# Patient Record
Sex: MALE | Race: AMERICAN INDIAN OR ALASKA NATIVE
[De-identification: names, ages, dates, MRNs, and addresses within clinical notes are randomized per-mention and may not be internally consistent; named-entity substitution may affect disease eponyms.]

---

## 2022-04-29 ENCOUNTER — HOSPITAL ENCOUNTER (INPATIENT)
Dept: HOSPITAL 5 - ED | Age: 52
LOS: 3 days | Discharge: HOME | DRG: 439 | End: 2022-05-02
Attending: INTERNAL MEDICINE | Admitting: INTERNAL MEDICINE
Payer: SELF-PAY

## 2022-04-29 DIAGNOSIS — M62.82: ICD-10-CM
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
## 2022-04-29 DIAGNOSIS — R74.01: ICD-10-CM

## 2022-04-29 DIAGNOSIS — F10.20: ICD-10-CM

## 2022-04-29 DIAGNOSIS — K85.90: Primary | ICD-10-CM

## 2022-04-29 LAB
ALBUMIN SERPL-MCNC: 4.8 G/DL (ref 3.9–5)
ALT SERPL-CCNC: 32 UNITS/L (ref 7–56)
BASOPHILS # (AUTO): 0.1 K/MM3 (ref 0–0.1)
BASOPHILS NFR BLD AUTO: 0.9 % (ref 0–1.8)
BUN SERPL-MCNC: 16 MG/DL (ref 9–20)
BUN/CREAT SERPL: 20 %
CALCIUM SERPL-MCNC: 9.4 MG/DL (ref 8.4–10.2)
EOSINOPHIL # BLD AUTO: 0.1 K/MM3 (ref 0–0.4)
EOSINOPHIL NFR BLD AUTO: 1 % (ref 0–4.3)
HCT VFR BLD CALC: 39.7 % (ref 35.5–45.6)
HEMOLYSIS INDEX: 18
HGB BLD-MCNC: 13 GM/DL (ref 11.8–15.2)
INR PPP: 0.87 (ref 0.87–1.13)
LYMPHOCYTES # BLD AUTO: 1 K/MM3 (ref 1.2–5.4)
LYMPHOCYTES NFR BLD AUTO: 12.5 % (ref 13.4–35)
MCHC RBC AUTO-ENTMCNC: 33 % (ref 32–34)
MCV RBC AUTO: 92 FL (ref 84–94)
MONOCYTES # (AUTO): 0.5 K/MM3 (ref 0–0.8)
MONOCYTES % (AUTO): 6.4 % (ref 0–7.3)
PLATELET # BLD: 247 K/MM3 (ref 140–440)
RBC # BLD AUTO: 4.32 M/MM3 (ref 3.65–5.03)

## 2022-04-29 PROCEDURE — 96365 THER/PROPH/DIAG IV INF INIT: CPT

## 2022-04-29 PROCEDURE — 96376 TX/PRO/DX INJ SAME DRUG ADON: CPT

## 2022-04-29 PROCEDURE — C9113 INJ PANTOPRAZOLE SODIUM, VIA: HCPCS

## 2022-04-29 PROCEDURE — 36415 COLL VENOUS BLD VENIPUNCTURE: CPT

## 2022-04-29 PROCEDURE — 85610 PROTHROMBIN TIME: CPT

## 2022-04-29 PROCEDURE — 83690 ASSAY OF LIPASE: CPT

## 2022-04-29 PROCEDURE — 80053 COMPREHEN METABOLIC PANEL: CPT

## 2022-04-29 PROCEDURE — 82150 ASSAY OF AMYLASE: CPT

## 2022-04-29 PROCEDURE — 80320 DRUG SCREEN QUANTALCOHOLS: CPT

## 2022-04-29 PROCEDURE — 85025 COMPLETE CBC W/AUTO DIFF WBC: CPT

## 2022-04-29 PROCEDURE — 93005 ELECTROCARDIOGRAM TRACING: CPT

## 2022-04-29 PROCEDURE — 96375 TX/PRO/DX INJ NEW DRUG ADDON: CPT

## 2022-04-29 PROCEDURE — G0480 DRUG TEST DEF 1-7 CLASSES: HCPCS

## 2022-04-29 PROCEDURE — 83735 ASSAY OF MAGNESIUM: CPT

## 2022-04-29 PROCEDURE — 99285 EMERGENCY DEPT VISIT HI MDM: CPT

## 2022-04-29 PROCEDURE — 82550 ASSAY OF CK (CPK): CPT

## 2022-04-29 PROCEDURE — 74177 CT ABD & PELVIS W/CONTRAST: CPT

## 2022-04-29 RX ADMIN — HYDROMORPHONE HYDROCHLORIDE PRN MG: 1 INJECTION, SOLUTION INTRAMUSCULAR; INTRAVENOUS; SUBCUTANEOUS at 12:13

## 2022-04-29 RX ADMIN — Medication SCH ML: at 21:47

## 2022-04-29 RX ADMIN — HEPARIN SODIUM SCH UNIT: 5000 INJECTION, SOLUTION INTRAVENOUS; SUBCUTANEOUS at 12:13

## 2022-04-29 RX ADMIN — ONDANSETRON PRN MG: 2 INJECTION INTRAMUSCULAR; INTRAVENOUS at 12:13

## 2022-04-29 RX ADMIN — HYDROMORPHONE HYDROCHLORIDE PRN MG: 1 INJECTION, SOLUTION INTRAMUSCULAR; INTRAVENOUS; SUBCUTANEOUS at 18:17

## 2022-04-29 RX ADMIN — Medication SCH ML: at 12:12

## 2022-04-29 RX ADMIN — HEPARIN SODIUM SCH UNIT: 5000 INJECTION, SOLUTION INTRAVENOUS; SUBCUTANEOUS at 21:52

## 2022-04-29 RX ADMIN — HYDROMORPHONE HYDROCHLORIDE PRN MG: 1 INJECTION, SOLUTION INTRAMUSCULAR; INTRAVENOUS; SUBCUTANEOUS at 21:48

## 2022-04-29 RX ADMIN — DEXTROSE AND SODIUM CHLORIDE SCH MLS/HR: 5; .9 INJECTION, SOLUTION INTRAVENOUS at 15:27

## 2022-04-29 NOTE — EMERGENCY DEPARTMENT REPORT
ED General Adult HPI





- General


Chief complaint: Abdominal Pain


Stated complaint: It is my pancreas


Time Seen by Provider: 04/29/22 06:10


Source: patient, RN notes reviewed


Mode of arrival: Ambulatory


Limitations: No Limitations





- History of Present Illness


Initial comments: 





This patient is a 52-year-old gentleman with a history of alcohol induced pancre

atitis, who presents to the ER today with a complaint of diffuse abdominal pain,

nausea and vomiting, consistent with prior episodes of pancreatitis.  Last 

alcoholic beverage was yesterday.





He is not homicidal suicidal.








-: Gradual


Location: abdomen


Severity scale (0 -10): 10


Consistency: constant


Improves with: medication, rest


Worsens with: movement





- Related Data


                                    Allergies











Allergy/AdvReac Type Severity Reaction Status Date / Time


 


No Known Allergies Allergy   Verified 04/29/22 06:16














ED Review of Systems


ROS: 


Stated complaint: ABD PAIN,VOMITING


Other details as noted in HPI





Constitutional: denies: fever


Eyes: denies: eye discharge


ENT: denies: epistaxis


Respiratory: denies: cough


Cardiovascular: denies: chest pain


Gastrointestinal: abdominal pain, nausea, vomiting.  denies: hematochezia


Genitourinary: denies: dysuria


Neurological: weakness


Psychiatric: denies: homicidal thoughts, suicidal thoughts





ED Physical Exam





- General


Limitations: No Limitations


General appearance: alert, anxious, in distress





- Head


Head exam: Present: atraumatic, normocephalic





- Eye


Eye exam: Present: normal appearance, EOMI.  Absent: conjunctival injection, 

nystagmus





- ENT


ENT exam: Present: normal exam, normal orophraynx, mucous membranes moist, 

normal external ear exam





- Neck


Neck exam: Present: normal inspection, full ROM.  Absent: tenderness, 

meningismus





- Respiratory


Respiratory exam: Present: decreased breath sounds.  Absent: respiratory distre

ss, wheezes, rales, rhonchi, stridor





- Cardiovascular


Cardiovascular Exam: Present: regular rate, normal rhythm, normal heart sounds. 

Absent: bradycardia, tachycardia, irregular rhythm, systolic murmur, diastolic 

murmur, rubs, gallop





- GI/Abdominal


GI/Abdominal exam: Present: soft, tenderness.  Absent: distended, guarding, 

rebound, rigid, pulsatile mass





- Rectal


Rectal exam: Present: deferred





- Extremities Exam


Extremities exam: Present: normal inspection, full ROM, other (2+ pulses noted 

in the bilateral upper and lower extremities.  There is no palpable cord.   

negative Homans sign.  Muscular compartments are soft.  The pelvis is stable.). 

Absent: pedal edema, calf tenderness





- Back Exam


Back exam: Present: normal inspection.  Absent: tenderness, CVA tenderness (R), 

CVA tenderness (L), paraspinal tenderness, vertebral tenderness





- Neurological Exam


Neurological exam: Present: alert, other (No facial droop.  Tongue midline.  

Extraocular movements intact bilaterally.  Facial sensation intact to light 

touch in V1, V2, V3 distribution bilaterally.  5 and a 5 strength in 4 

extremities.  Sensation intact to light touch in 4 extremities.)





- Psychiatric


Psychiatric exam: Present: anxious





- Skin


Skin exam: Present: warm, dry, intact, normal color.  Absent: rash





ED Course


                                   Vital Signs











  04/29/22 04/29/22 04/29/22





  06:07 09:05 09:06


 


Temperature 98.9 F  


 


Pulse Rate 66 60 


 


Respiratory 30 H 20 





Rate   


 


Blood Pressure 147/89 150/90 





[Left]   


 


O2 Sat by Pulse 99 100 100





Oximetry   














- Reevaluation(s)


Reevaluation #1: 





04/29/22 09:28


Differential diagnosis, include but not limited to: Pancreatitis, obstruction, 

colitis, gastroenteritis





Assessment and plan: 52-year-old gentleman with probable alcohol induced 

pancreatitis.  Lipase is elevated at 1300.  He is found to have evidence of 

dehydration and elevated CK.





CT scan of the abdomen pelvis ordered and pending.  Admit to the medical service

 for dehydration, and probable alcohol induced pancreatitis.  Patient is agre

eable to this plan of care.  Discussed with hospital physician once CT scan has 

been formally interpreted by radiology


04/29/22 09:31


The patient is agreeable to admission and hospitalization


04/29/22 09:37


CT scan abdomen pelvis reviewed and appreciated.  Acute uncomplicated 

interstitial pancreatitis.  Hospital physician, Dr. RICHAR Charles to admit to [facility]





ED Medical Decision Making





- Lab Data


Result diagrams: 


                                 04/29/22 07:04





                                 04/29/22 07:04








                                   Vital Signs











  04/29/22 04/29/22 04/29/22





  06:07 09:05 09:06


 


Temperature 98.9 F  


 


Pulse Rate 66 60 


 


Respiratory 30 H 20 





Rate   


 


Blood Pressure 147/89 150/90 





[Left]   


 


O2 Sat by Pulse 99 100 100





Oximetry   











                                   Lab Results











  04/29/22 04/29/22 04/29/22 Range/Units





  07:04 07:04 07:04 


 


WBC  7.7    (4.5-11.0)  K/mm3


 


RBC  4.32    (3.65-5.03)  M/mm3


 


Hgb  13.0    (11.8-15.2)  gm/dl


 


Hct  39.7    (35.5-45.6)  %


 


MCV  92    (84-94)  fl


 


MCH  30    (28-32)  pg


 


MCHC  33    (32-34)  %


 


RDW  13.8    (13.2-15.2)  %


 


Plt Count  247    (140-440)  K/mm3


 


Lymph % (Auto)  12.5 L    (13.4-35.0)  %


 


Mono % (Auto)  6.4    (0.0-7.3)  %


 


Eos % (Auto)  1.0    (0.0-4.3)  %


 


Baso % (Auto)  0.9    (0.0-1.8)  %


 


Lymph # (Auto)  1.0 L    (1.2-5.4)  K/mm3


 


Mono # (Auto)  0.5    (0.0-0.8)  K/mm3


 


Eos # (Auto)  0.1    (0.0-0.4)  K/mm3


 


Baso # (Auto)  0.1    (0.0-0.1)  K/mm3


 


Seg Neutrophils %  79.2 H    (40.0-70.0)  %


 


Seg Neutrophils #  6.1    (1.8-7.7)  K/mm3


 


PT     (12.2-14.9)  Sec.


 


INR     (0.87-1.13)  


 


Sodium   136 L   (137-145)  mmol/L


 


Potassium   4.2   (3.6-5.0)  mmol/L


 


Chloride   100.9   ()  mmol/L


 


Carbon Dioxide   24   (22-30)  mmol/L


 


Anion Gap   15   mmol/L


 


BUN   16   (9-20)  mg/dL


 


Creatinine   0.8   (0.8-1.3)  mg/dL


 


Estimated GFR   > 60   ml/min


 


BUN/Creatinine Ratio   20   %


 


Glucose   148 H   ()  mg/dL


 


Calcium   9.4   (8.4-10.2)  mg/dL


 


Magnesium     (1.7-2.3)  mg/dL


 


Total Bilirubin   0.60   (0.1-1.2)  mg/dL


 


AST   52 H   (5-40)  units/L


 


ALT   32   (7-56)  units/L


 


Alkaline Phosphatase   46   ()  units/L


 


Total Creatine Kinase     ()  units/L


 


Total Protein   7.0   (6.3-8.2)  g/dL


 


Albumin   4.8   (3.9-5)  g/dL


 


Albumin/Globulin Ratio   2.2   %


 


Lipase    1328 H  (13-60)  units/L


 


Plasma/Serum Alcohol     (0-0.07)  %














  04/29/22 04/29/22 04/29/22 Range/Units





  07:30 08:16 08:16 


 


WBC     (4.5-11.0)  K/mm3


 


RBC     (3.65-5.03)  M/mm3


 


Hgb     (11.8-15.2)  gm/dl


 


Hct     (35.5-45.6)  %


 


MCV     (84-94)  fl


 


MCH     (28-32)  pg


 


MCHC     (32-34)  %


 


RDW     (13.2-15.2)  %


 


Plt Count     (140-440)  K/mm3


 


Lymph % (Auto)     (13.4-35.0)  %


 


Mono % (Auto)     (0.0-7.3)  %


 


Eos % (Auto)     (0.0-4.3)  %


 


Baso % (Auto)     (0.0-1.8)  %


 


Lymph # (Auto)     (1.2-5.4)  K/mm3


 


Mono # (Auto)     (0.0-0.8)  K/mm3


 


Eos # (Auto)     (0.0-0.4)  K/mm3


 


Baso # (Auto)     (0.0-0.1)  K/mm3


 


Seg Neutrophils %     (40.0-70.0)  %


 


Seg Neutrophils #     (1.8-7.7)  K/mm3


 


PT   12.8   (12.2-14.9)  Sec.


 


INR   0.87   (0.87-1.13)  


 


Sodium     (137-145)  mmol/L


 


Potassium     (3.6-5.0)  mmol/L


 


Chloride     ()  mmol/L


 


Carbon Dioxide     (22-30)  mmol/L


 


Anion Gap     mmol/L


 


BUN     (9-20)  mg/dL


 


Creatinine     (0.8-1.3)  mg/dL


 


Estimated GFR     ml/min


 


BUN/Creatinine Ratio     %


 


Glucose     ()  mg/dL


 


Calcium     (8.4-10.2)  mg/dL


 


Magnesium  2.20    (1.7-2.3)  mg/dL


 


Total Bilirubin     (0.1-1.2)  mg/dL


 


AST     (5-40)  units/L


 


ALT     (7-56)  units/L


 


Alkaline Phosphatase     ()  units/L


 


Total Creatine Kinase  1260 H    ()  units/L


 


Total Protein     (6.3-8.2)  g/dL


 


Albumin     (3.9-5)  g/dL


 


Albumin/Globulin Ratio     %


 


Lipase     (13-60)  units/L


 


Plasma/Serum Alcohol    < 0.01  (0-0.07)  %














- EKG Data


-: EKG Interpreted by Me


EKG shows normal: sinus rhythm


Rate: normal





- EKG Data


When compared to previous EKG there are: previous EKG unavailable





04/29/22 09:26


The EKG is interpreted at 08: 51





Sinus rhythm, bradycardia, 56 bpm.  Normal axis, normal P wave axis, left 

ventricular hypertrophy, and motion artifact.  This is an abnormal EKG.  This is

 not a STEMI





- Radiology Data


Radiology results: pending, report reviewed, image reviewed





CT ABDOMEN AND PELVIS WITH CONTRAST  INDICATION / CLINICAL INFORMATION: acute 

abd pain pancreatitis omni 300 100ml.  TECHNIQUE: Axial CT images were obtained 

through the abdomen and pelvis after 100 cc of Omnipaque 300 IV contrast. 

Sagittal and coronal reformatted images. All CT scans at this location are 

performed using CT dose reduction for ALARA by means of automated exposure 

control.  COMPARISON: None available.  FINDINGS: LOWER CHEST: Mild cardiomegaly 

is suspected. The visualized lung bases are clear. LIVER: There is mild 

hepatomegaly with steatosis. No focal liver lesion. GALLBLADDER: No significant 

abnormality. BILE DUCTS: No significant abnormality. PANCREAS: Mild inflammatory

 fat stranding and fluid surrounds the pancreas. No discrete mass, pseudocyst or

 perfusion defect. SPLEEN: No significant abnormality. ADRENALS: No significant 

abnormality. RIGHT KIDNEY and URETER: No significant abnormality. LEFT KIDNEY 

and URETER: No significant abnormality.  STOMACH and SMALL BOWEL: No significant

 abnormality. COLON: No significant abnormality. APPENDIX: No significant 

abnormality. PERITONEUM: No free air. No fluid collection. LYMPH NODES: No 

significant adenopathy. AORTA and ARTERIES: No significant abnormality. IVC and 

VEINS: No significant abnormality.  URINARY BLADDER: No significant abnormality.

 REPRODUCTIVE ORGANS: No significant abnormality.  ADDITIONAL FINDINGS: None.  

SKELETAL SYSTEM: No significant abnormality.  IMPRESSION: Findings consistent 

with acute interstitial pancreatitis. No obvious biliary stones on CT. 

Hepatomegaly with steatosis. Mild cardiomegaly.  Signer Name: Cordell Good Jr, MD Signed: 4/29/2022 8:30 AM Workstation Name: [id number]


Critical care attestation.: 


If time is entered above; I have spent that time in minutes in the direct care 

of this critically ill patient, excluding procedure time.








ED Disposition


Clinical Impression: 


 Acute pancreatitis, Elevated CK, Alcohol abuse





Disposition: 09 ADMITTED AS INPATIENT


Is pt being admited?: Yes


Does the pt Need Aspirin: No


Condition: Good

## 2022-04-29 NOTE — HISTORY AND PHYSICAL REPORT
History of Present Illness


Date of examination: 04/29/22


Date of admission: 


04/29/22


Chief complaint: 





Acute Abdominal pain for  2 days


History of present illness: 


52-year-old with history of alcohol dependence and recurrent pancreatitis 

secondary to alcohol comes in for diffuse abdominal pain since yesterday.  Pain 

is about 10 on a scale of 1-10.  Pain is all over the abdomen.  Radiation to the

back present.  Last alcohol drink was yesterday.  No fever or chills. Alcohol is

exacerbating factor.  No relieving factors.





 Review of Systems 


ROS: 


Stated complaint: ABD PAIN,VOMITING


Other details as noted in HPI





Constitutional: denies: fever


Eyes: denies: eye discharge


ENT: denies: epistaxis


Respiratory: denies: cough


Cardiovascular: denies: chest pain


Gastrointestinal: abdominal pain, nausea, vomiting.  denies: hematochezia


Genitourinary: denies: dysuria


Neurological: weakness


Psychiatric: denies: homicidal thoughts, suicidal thoughts





Past History


Past Medical History: No medical history


Past Surgical History: No surgical history


Social history: lives with family, alcohol abuse, full code


Family history: hypertension





Medications and Allergies


                                    Allergies











Allergy/AdvReac Type Severity Reaction Status Date / Time


 


No Known Allergies Allergy   Verified 04/29/22 06:16











                                Home Medications











 Medication  Instructions  Recorded  Confirmed  Last Taken  Type


 


No Known Home Medications [No  04/30/22 04/30/22 Unknown History





Reported Home Medications]     











Active Meds: 


Active Medications





Thiamine HCl 100 mg/ Folic Acid 1 mg/ Multivitamins/Minerals 10 ml/ Sodium 

Chloride  1,011.2 mls @ 250 mls/hr IV ONCE ONE


   Stop: 04/29/22 14:02


   Last Admin: 04/29/22 09:51 Dose:  250 mls/hr


   


Lorazepam (Lorazepam 2 Mg/Ml Vial)  2 mg IV Q1HR PRN


   PRN Reason: CIWA-Ar 8-15


Lorazepam (Lorazepam 2 Mg/Ml Vial)  4 mg IV Q1HR PRN


   PRN Reason: CIWA-Ar 16-25


Lorazepam (Lorazepam 2 Mg/Ml Vial)  4 mg IV Q15MIN PRN


   PRN Reason: CIWA-Ar >25











Exam





- Constitutional


Vitals: 


                                        











Temp Pulse Resp BP Pulse Ox


 


 98.9 F   64   15   146/90   98 


 


 04/29/22 06:07  04/29/22 11:00  04/29/22 11:00  04/29/22 11:00  04/29/22 11:00











General appearance: Present: mild distress, well-nourished





- EENT


Eyes: Present: PERRL


ENT: hearing intact, clear oral mucosa





- Neck


Neck: Present: supple, normal ROM





- Respiratory


Respiratory effort: normal


Respiratory: bilateral: CTA





- Cardiovascular


Heart rate: 78


Rhythm: regular


Heart Sounds: Present: S1 & S2.  Absent: rub, click





- Extremities


Extremities: pulses symmetrical, No edema


Peripheral Pulses: within normal limits





- Abdominal


General gastrointestinal: Present: soft, non-tender, non-distended, normal bowel

sounds


Localized gastrointestinal: tender: diffuse, guarding: diffuse


Male genitourinary: Present: normal





- Integumentary


Integumentary: Present: clear, warm, dry





- Musculoskeletal


Musculoskeletal: gait normal, strength equal bilaterally





- Psychiatric


Psychiatric: appropriate mood/affect, intact judgment & insight





- Neurologic


Neurologic: CNII-XII intact, moves all extremities





- Allied Health


Allied health notes reviewed: nursing, case management





Results





- Labs


CBC & Chem 7: 


                                 05/02/22 04:28





                                 05/02/22 04:28


Labs: 


                             Laboratory Last Values











WBC  7.7 K/mm3 (4.5-11.0)   04/29/22  07:04    


 


RBC  4.32 M/mm3 (3.65-5.03)   04/29/22  07:04    


 


Hgb  13.0 gm/dl (11.8-15.2)   04/29/22  07:04    


 


Hct  39.7 % (35.5-45.6)   04/29/22  07:04    


 


MCV  92 fl (84-94)   04/29/22  07:04    


 


MCH  30 pg (28-32)   04/29/22  07:04    


 


MCHC  33 % (32-34)   04/29/22  07:04    


 


RDW  13.8 % (13.2-15.2)   04/29/22  07:04    


 


Plt Count  247 K/mm3 (140-440)   04/29/22  07:04    


 


Lymph % (Auto)  12.5 % (13.4-35.0)  L  04/29/22  07:04    


 


Mono % (Auto)  6.4 % (0.0-7.3)   04/29/22  07:04    


 


Eos % (Auto)  1.0 % (0.0-4.3)   04/29/22  07:04    


 


Baso % (Auto)  0.9 % (0.0-1.8)   04/29/22  07:04    


 


Lymph # (Auto)  1.0 K/mm3 (1.2-5.4)  L  04/29/22  07:04    


 


Mono # (Auto)  0.5 K/mm3 (0.0-0.8)   04/29/22  07:04    


 


Eos # (Auto)  0.1 K/mm3 (0.0-0.4)   04/29/22  07:04    


 


Baso # (Auto)  0.1 K/mm3 (0.0-0.1)   04/29/22  07:04    


 


Seg Neutrophils %  79.2 % (40.0-70.0)  H  04/29/22  07:04    


 


Seg Neutrophils #  6.1 K/mm3 (1.8-7.7)   04/29/22  07:04    


 


PT  12.8 Sec. (12.2-14.9)   04/29/22  08:16    


 


INR  0.87  (0.87-1.13)   04/29/22  08:16    


 


Sodium  136 mmol/L (137-145)  L  04/29/22  07:04    


 


Potassium  4.2 mmol/L (3.6-5.0)   04/29/22  07:04    


 


Chloride  100.9 mmol/L ()   04/29/22  07:04    


 


Carbon Dioxide  24 mmol/L (22-30)   04/29/22  07:04    


 


Anion Gap  15 mmol/L  04/29/22  07:04    


 


BUN  16 mg/dL (9-20)   04/29/22  07:04    


 


Creatinine  0.8 mg/dL (0.8-1.3)   04/29/22  07:04    


 


Estimated GFR  > 60 ml/min  04/29/22  07:04    


 


BUN/Creatinine Ratio  20 %  04/29/22  07:04    


 


Glucose  148 mg/dL ()  H  04/29/22  07:04    


 


Calcium  9.4 mg/dL (8.4-10.2)   04/29/22  07:04    


 


Magnesium  2.20 mg/dL (1.7-2.3)   04/29/22  07:30    


 


Total Bilirubin  0.60 mg/dL (0.1-1.2)   04/29/22  07:04    


 


AST  52 units/L (5-40)  H  04/29/22  07:04    


 


ALT  32 units/L (7-56)   04/29/22  07:04    


 


Alkaline Phosphatase  46 units/L ()   04/29/22  07:04    


 


Total Creatine Kinase  1260 units/L ()  H  04/29/22  07:30    


 


Total Protein  7.0 g/dL (6.3-8.2)   04/29/22  07:04    


 


Albumin  4.8 g/dL (3.9-5)   04/29/22  07:04    


 


Albumin/Globulin Ratio  2.2 %  04/29/22  07:04    


 


Lipase  1328 units/L (13-60)  H  04/29/22  07:04    


 


Plasma/Serum Alcohol  < 0.01 % (0-0.07)   04/29/22  08:16    








                                    Short CBC











  04/29/22 Range/Units





  07:04 


 


WBC  7.7  (4.5-11.0)  K/mm3


 


Hgb  13.0  (11.8-15.2)  gm/dl


 


Hct  39.7  (35.5-45.6)  %


 


Plt Count  247  (140-440)  K/mm3








                                       [facility]











  04/29/22





  07:04


 


Sodium  136 L


 


Potassium  4.2


 


Chloride  100.9


 


Carbon Dioxide  24


 


BUN  16


 


Creatinine  0.8


 


Glucose  148 H


 


Calcium  9.4








                                 Cardiac Enzymes











  04/29/22 Range/Units





  07:30 


 


Total Creatine Kinase  1260 H  ()  units/L








                                 Liver Function











  04/29/22 Range/Units





  07:04 


 


Total Bilirubin  0.60  (0.1-1.2)  mg/dL


 


AST  52 H  (5-40)  units/L


 


ALT  32  (7-56)  units/L


 


Alkaline Phosphatase  46  ()  units/L


 


Albumin  4.8  (3.9-5)  g/dL








                                    Short CBC











  05/02/22 Range/Units





  04:28 


 


WBC  4.5  (4.5-11.0)  K/mm3


 


Hgb  10.8 L  (11.8-15.2)  gm/dl


 


Hct  33.5 L  (35.5-45.6)  %


 


Plt Count  198  (140-440)  K/mm3








                                       [facility]











  05/02/22





  04:28


 


Sodium  142


 


Potassium  3.5 L


 


Chloride  108.6 H


 


Carbon Dioxide  23


 


BUN  4 L


 


Creatinine  0.7 L


 


Glucose  98


 


Calcium  8.4








                                 Liver Function











  05/02/22 Range/Units





  04:28 


 


Total Bilirubin  0.40  (0.1-1.2)  mg/dL


 


AST  22  (5-40)  units/L


 


ALT  16  (7-56)  units/L


 


Alkaline Phosphatase  35  ()  units/L


 


Albumin  3.6 L  (3.9-5)  g/dL














- Imaging and Cardiology


CT scan - abdomen: report reviewed


Imaging and Cardiology: 





CT abdomen and pelvis





Findings consistent with acute interstitial pancreatitis.  No obvious biliary 

stones on CT.





Assessment and Plan


Advance Directives: Yes (Full code)


VTE prophylaxis?: Chemical


Plan of care discussed with patient/family: Yes





- Patient Problems


(1) Acute pancreatitis


Current Visit: Yes   Status: Acute   


Qualifiers: 


   Pancreatitis type: alcohol induced 


Plan to address problem: 


Lipase is 1328


Keep the patient n.p.o.


IV fluids for now


Pain management








(2) Rhabdomyolysis


Current Visit: Yes   Status: Acute   


Qualifiers: 


   Rhabdomyolysis type: non-traumatic   Qualified Code(s): M62.82 - 

Rhabdomyolysis   


Plan to address problem: 


Mild


IV fluids for now








(3) Transaminitis


Current Visit: Yes   Status: Acute   


Plan to address problem: 


Mild


AST is 52


ALT is 32


Alcohol induced








(4) EtOH dependence


Current Visit: Yes   Status: Chronic   


Qualifiers: 


   Substance use status: uncomplicated   Qualified Code(s): F10.20 - Alcohol 

dependence, uncomplicated   


Plan to address problem: 


Initiated on CIWA protocol


Patient counseled about alcohol intake and to avoid alcohol








(5) DVT prophylaxis


Current Visit: Yes   Status: Acute   


Plan to address problem: 


On anticoagulation GI prophylaxis








(6) Advance care planning


Current Visit: Yes   Status: Acute   


Plan to address problem: 


Disease education conducted, care plan discussed, diagnosis discussed, prognosis

discussed.  Patient is full code.  Patient acknowledged understanding and 

agreement with care plan.  +30 minutes.

## 2022-04-29 NOTE — CAT SCAN REPORT
CT ABDOMEN AND PELVIS WITH CONTRAST



INDICATION / CLINICAL INFORMATION: acute abd pain pancreatitis omni 300 100ml.



TECHNIQUE:

Axial CT images were obtained through the abdomen and pelvis after 100 cc of Omnipaque 300 IV contras
t. Sagittal and coronal reformatted images. All CT scans at this location are performed using CT dose
 reduction for ALARA by means of automated exposure control. 



COMPARISON: None available.



FINDINGS:

LOWER CHEST: Mild cardiomegaly is suspected. The visualized lung bases are clear.

LIVER: There is mild hepatomegaly with steatosis. No focal liver lesion.

GALLBLADDER: No significant abnormality.  

BILE DUCTS: No significant abnormality.

PANCREAS: Mild inflammatory fat stranding and fluid surrounds the pancreas. No discrete mass, pseudoc
yst or perfusion defect.

SPLEEN: No significant abnormality.

ADRENALS: No significant abnormality.

RIGHT KIDNEY and URETER: No significant abnormality.

LEFT KIDNEY and URETER: No significant abnormality.



STOMACH and SMALL BOWEL: No significant abnormality. 

COLON: No significant abnormality. 

APPENDIX: No significant abnormality.  

PERITONEUM: No free air. No fluid collection.

LYMPH NODES: No significant adenopathy.

AORTA and ARTERIES: No significant abnormality. 

IVC and VEINS: No significant abnormality.



URINARY BLADDER: No significant abnormality.

REPRODUCTIVE ORGANS: No significant abnormality.



ADDITIONAL FINDINGS: None.



SKELETAL SYSTEM: No significant abnormality.



IMPRESSION:

Findings consistent with acute interstitial pancreatitis. No obvious biliary stones on CT.

Hepatomegaly with steatosis.

Mild cardiomegaly.



Signer Name: Cordell Good Jr, MD 

Signed: 4/29/2022 9:30 AM

Workstation Name: [id number]

## 2022-04-30 LAB
ALBUMIN SERPL-MCNC: 4.2 G/DL (ref 3.9–5)
ALT SERPL-CCNC: 23 UNITS/L (ref 7–56)
BASOPHILS # (AUTO): 0.1 K/MM3 (ref 0–0.1)
BASOPHILS NFR BLD AUTO: 1.2 % (ref 0–1.8)
BUN SERPL-MCNC: 8 MG/DL (ref 9–20)
BUN/CREAT SERPL: 11 %
CALCIUM SERPL-MCNC: 8.9 MG/DL (ref 8.4–10.2)
EOSINOPHIL # BLD AUTO: 0.1 K/MM3 (ref 0–0.4)
EOSINOPHIL NFR BLD AUTO: 1.1 % (ref 0–4.3)
HCT VFR BLD CALC: 38.7 % (ref 35.5–45.6)
HEMOLYSIS INDEX: 8
HGB BLD-MCNC: 12.7 GM/DL (ref 11.8–15.2)
LYMPHOCYTES # BLD AUTO: 1 K/MM3 (ref 1.2–5.4)
LYMPHOCYTES NFR BLD AUTO: 13.8 % (ref 13.4–35)
MCHC RBC AUTO-ENTMCNC: 33 % (ref 32–34)
MCV RBC AUTO: 92 FL (ref 84–94)
MONOCYTES # (AUTO): 0.6 K/MM3 (ref 0–0.8)
MONOCYTES % (AUTO): 8.5 % (ref 0–7.3)
PLATELET # BLD: 209 K/MM3 (ref 140–440)
RBC # BLD AUTO: 4.22 M/MM3 (ref 3.65–5.03)

## 2022-04-30 RX ADMIN — ONDANSETRON PRN MG: 2 INJECTION INTRAMUSCULAR; INTRAVENOUS at 08:06

## 2022-04-30 RX ADMIN — NICOTINE SCH MG: 14 PATCH TRANSDERMAL at 16:00

## 2022-04-30 RX ADMIN — HEPARIN SODIUM SCH UNIT: 5000 INJECTION, SOLUTION INTRAVENOUS; SUBCUTANEOUS at 10:00

## 2022-04-30 RX ADMIN — HYDROMORPHONE HYDROCHLORIDE PRN MG: 1 INJECTION, SOLUTION INTRAMUSCULAR; INTRAVENOUS; SUBCUTANEOUS at 08:05

## 2022-04-30 RX ADMIN — ZOLPIDEM TARTRATE PRN MG: 5 TABLET ORAL at 22:16

## 2022-04-30 RX ADMIN — Medication SCH ML: at 13:00

## 2022-04-30 RX ADMIN — DEXTROSE AND SODIUM CHLORIDE SCH MLS/HR: 5; .9 INJECTION, SOLUTION INTRAVENOUS at 22:15

## 2022-04-30 RX ADMIN — HYDROMORPHONE HYDROCHLORIDE PRN MG: 1 INJECTION, SOLUTION INTRAMUSCULAR; INTRAVENOUS; SUBCUTANEOUS at 15:25

## 2022-04-30 RX ADMIN — HYDROMORPHONE HYDROCHLORIDE PRN MG: 1 INJECTION, SOLUTION INTRAMUSCULAR; INTRAVENOUS; SUBCUTANEOUS at 04:36

## 2022-04-30 RX ADMIN — DEXTROSE AND SODIUM CHLORIDE SCH MLS/HR: 5; .9 INJECTION, SOLUTION INTRAVENOUS at 00:48

## 2022-04-30 RX ADMIN — HYDROMORPHONE HYDROCHLORIDE PRN MG: 1 INJECTION, SOLUTION INTRAMUSCULAR; INTRAVENOUS; SUBCUTANEOUS at 19:51

## 2022-04-30 RX ADMIN — HYDROMORPHONE HYDROCHLORIDE PRN MG: 1 INJECTION, SOLUTION INTRAMUSCULAR; INTRAVENOUS; SUBCUTANEOUS at 11:34

## 2022-04-30 RX ADMIN — Medication SCH ML: at 22:17

## 2022-04-30 RX ADMIN — HEPARIN SODIUM SCH UNIT: 5000 INJECTION, SOLUTION INTRAVENOUS; SUBCUTANEOUS at 22:16

## 2022-05-01 RX ADMIN — DEXTROSE AND SODIUM CHLORIDE SCH MLS/HR: 5; .9 INJECTION, SOLUTION INTRAVENOUS at 07:31

## 2022-05-01 RX ADMIN — HEPARIN SODIUM SCH UNIT: 5000 INJECTION, SOLUTION INTRAVENOUS; SUBCUTANEOUS at 21:30

## 2022-05-01 RX ADMIN — HEPARIN SODIUM SCH UNIT: 5000 INJECTION, SOLUTION INTRAVENOUS; SUBCUTANEOUS at 09:26

## 2022-05-01 RX ADMIN — HYDROMORPHONE HYDROCHLORIDE PRN MG: 1 INJECTION, SOLUTION INTRAMUSCULAR; INTRAVENOUS; SUBCUTANEOUS at 07:30

## 2022-05-01 RX ADMIN — DEXTROSE AND SODIUM CHLORIDE SCH MLS/HR: 5; .9 INJECTION, SOLUTION INTRAVENOUS at 15:45

## 2022-05-01 RX ADMIN — Medication SCH ML: at 21:30

## 2022-05-01 RX ADMIN — NICOTINE SCH MG: 14 PATCH TRANSDERMAL at 09:26

## 2022-05-01 RX ADMIN — ZOLPIDEM TARTRATE PRN MG: 5 TABLET ORAL at 21:30

## 2022-05-01 RX ADMIN — HYDROMORPHONE HYDROCHLORIDE PRN MG: 1 INJECTION, SOLUTION INTRAMUSCULAR; INTRAVENOUS; SUBCUTANEOUS at 10:32

## 2022-05-01 RX ADMIN — Medication SCH ML: at 09:27

## 2022-05-01 RX ADMIN — HYDROMORPHONE HYDROCHLORIDE PRN MG: 1 INJECTION, SOLUTION INTRAMUSCULAR; INTRAVENOUS; SUBCUTANEOUS at 18:08

## 2022-05-01 NOTE — PROGRESS NOTE
Assessment and Plan





- Patient Problems


(1) Acute pancreatitis


Current Visit: Yes   Status: Acute   


Qualifiers: 


   Pancreatitis type: alcohol induced 


Plan to address problem: 


Lipase is 706.  Improved from 1328


Amylase is 492


Keep the patient n.p.o.


IV fluids for now


Pain management








(2) Rhabdomyolysis


Current Visit: Yes   Status: Acute   


Qualifiers: 


   Rhabdomyolysis type: non-traumatic   Qualified Code(s): M62.82 - 

Rhabdomyolysis   


Plan to address problem: 


Mild


IV fluids for now








(3) EtOH dependence


Current Visit: Yes   Status: Acute   


Qualifiers: 


   Substance use status: uncomplicated   Qualified Code(s): F10.20 - Alcohol 

dependence, uncomplicated   


Plan to address problem: 


Patient counseled about alcohol and avoidance of alcohol.  Also educated about 

alcohol effects on liver and pancreas








(4) Advance care planning


Current Visit: Yes   Status: Acute   


Plan to address problem: 


Disease education conducted, care plan discussed, diagnosis discussed, prognosis

discussed.  Patient is full code.  Patient acknowledged understanding and 

agreement with care plan.  +30 minutes.








(5) DVT prophylaxis


Current Visit: Yes   Status: Acute   


Plan to address problem: 


On anticoagulation GI prophylaxis








(6) Transaminitis


Current Visit: Yes   Status: Acute   


Plan to address problem: 


Improved to normal








Subjective


Date of service: 04/30/22


Principal diagnosis: Acute abdominal pain, acute pancreatitis


Interval history: 





52-year-old with history of alcohol dependence and recurrent pancreatitis 

secondary to alcohol comes in for diffuse abdominal pain since yesterday.  Pain 

is about 10 on a scale of 1-10.  Pain is all over the abdomen.  Radiation to the

back present.  Last alcohol drink was yesterday.  No fever or chills. Alcohol is

exacerbating factor.  No relieving factors.





April 30, 2022


Patient is n.p.o.


Abdominal pain slightly better


Lipase is coming down to 706 from 1328


Amylase is 492








Objective





- Constitutional


Vitals: 


                               Vital Signs - 12hr











  04/30/22 04/30/22 05/01/22





  19:55 21:54 04:19


 


Temperature  97.8 F 98.3 F


 


Pulse Rate  53 L 66


 


Respiratory  18 18





Rate   


 


Blood Pressure  137/68 147/84


 


O2 Sat by Pulse 99 98 98





Oximetry   











General appearance: Present: no acute distress, well-nourished





- EENT


Eyes: PERRL, EOM intact


ENT: hearing intact, clear oral mucosa


Ears: bilateral: normal





- Neck


Neck: supple, normal ROM





- Respiratory


Respiratory effort: normal


Respiratory: bilateral: CTA





- Breasts


Breasts: normal





- Cardiovascular


Heart rate: 78


Rhythm: regular


Heart Sounds: Present: S1 & S2.  Absent: gallop, rub


Extremities: pulses intact, No edema, normal color, Full ROM





- Gastrointestinal


General gastrointestinal: Present: soft, non-tender, non-distended, normal bowel

sounds





- Genitourinary


Male genitourinary: normal





- Integumentary


Integumentary: clear, warm, dry





- Musculoskeletal


Musculoskeletal: 1, strength equal bilaterally





- Neurologic


Neurologic: moves all extremities





- Psychiatric


Psychiatric: memory intact, appropriate mood/affect, intact judgment & insight





- Allied health notes


Allied health notes reviewed: nursing, case management





- Labs


CBC & Chem 7: 


                                 05/02/22 04:28





                                 05/02/22 04:28

## 2022-05-02 VITALS — DIASTOLIC BLOOD PRESSURE: 87 MMHG | SYSTOLIC BLOOD PRESSURE: 144 MMHG

## 2022-05-02 LAB
ALBUMIN SERPL-MCNC: 3.6 G/DL (ref 3.9–5)
ALT SERPL-CCNC: 16 UNITS/L (ref 7–56)
BASOPHILS # (AUTO): 0 K/MM3 (ref 0–0.1)
BASOPHILS NFR BLD AUTO: 1.1 % (ref 0–1.8)
BUN SERPL-MCNC: 4 MG/DL (ref 9–20)
BUN/CREAT SERPL: 6 %
CALCIUM SERPL-MCNC: 8.4 MG/DL (ref 8.4–10.2)
EOSINOPHIL # BLD AUTO: 0.2 K/MM3 (ref 0–0.4)
EOSINOPHIL NFR BLD AUTO: 4.2 % (ref 0–4.3)
HCT VFR BLD CALC: 33.5 % (ref 35.5–45.6)
HEMOLYSIS INDEX: 7
HGB BLD-MCNC: 10.8 GM/DL (ref 11.8–15.2)
LYMPHOCYTES # BLD AUTO: 1.1 K/MM3 (ref 1.2–5.4)
LYMPHOCYTES NFR BLD AUTO: 24.7 % (ref 13.4–35)
MCHC RBC AUTO-ENTMCNC: 32 % (ref 32–34)
MCV RBC AUTO: 93 FL (ref 84–94)
MONOCYTES # (AUTO): 0.5 K/MM3 (ref 0–0.8)
MONOCYTES % (AUTO): 10.9 % (ref 0–7.3)
PLATELET # BLD: 198 K/MM3 (ref 140–440)
RBC # BLD AUTO: 3.62 M/MM3 (ref 3.65–5.03)

## 2022-05-02 RX ADMIN — HYDROMORPHONE HYDROCHLORIDE PRN MG: 1 INJECTION, SOLUTION INTRAMUSCULAR; INTRAVENOUS; SUBCUTANEOUS at 07:59

## 2022-05-02 RX ADMIN — NICOTINE SCH MG: 14 PATCH TRANSDERMAL at 09:03

## 2022-05-02 RX ADMIN — Medication SCH ML: at 09:03

## 2022-05-02 RX ADMIN — HEPARIN SODIUM SCH UNIT: 5000 INJECTION, SOLUTION INTRAVENOUS; SUBCUTANEOUS at 09:03

## 2022-05-02 RX ADMIN — DEXTROSE AND SODIUM CHLORIDE SCH MLS/HR: 5; .9 INJECTION, SOLUTION INTRAVENOUS at 01:59

## 2022-05-02 RX ADMIN — HYDROMORPHONE HYDROCHLORIDE PRN MG: 1 INJECTION, SOLUTION INTRAMUSCULAR; INTRAVENOUS; SUBCUTANEOUS at 02:48

## 2022-05-02 NOTE — DISCHARGE SUMMARY
Providers





- Providers


Date of Admission: 


04/29/22 11:52





Date of discharge: 05/02/22


Attending physician: 


DENICE MALDONADO





Primary care physician: 


PRIMARY CARE MD








Hospitalization


Condition: Good


Disposition: 01 HOME / SELF CARE / HOMELESS





- Discharge Diagnoses


(1) Acute pancreatitis


Status: Acute   


Qualifiers: 


   Pancreatitis type: alcohol induced 





(2) Rhabdomyolysis


Status: Acute   


Qualifiers: 


   Rhabdomyolysis type: non-traumatic   Qualified Code(s): M62.82 - 

Rhabdomyolysis   





(3) EtOH dependence


Status: Acute   


Qualifiers: 


   Substance use status: uncomplicated   Qualified Code(s): F10.20 - Alcohol 

dependence, uncomplicated   





(4) Transaminitis


Status: Acute   





(5) DVT prophylaxis


Status: Acute   





(6) Advance care planning


Status: Acute   





(7) Discharge planning issues


Status: Acute   





Exam





- Constitutional


Vitals: 


                                        











Temp Pulse Resp BP Pulse Ox


 


 98.3 F   66   18   144/87   99 


 


 05/02/22 11:22  05/02/22 11:22  05/02/22 11:22  05/02/22 11:22  05/02/22 11:22














Plan


Follow up with: 


PRIMARY CARE,MD [Primary Care Provider] - 7 Days

## 2022-05-02 NOTE — ELECTROCARDIOGRAPH REPORT
[facility]

                                       

Test Date:    2022               Test Time:    08:51:16

Pat Name:     TIKA LONDON               Department:   

Patient ID:   [id number]          Room:         A372 1

Gender:       M                        Technician:   BP

:          1970               Requested By: MARIANNE BACK

Order Number: [id number]              Reading MD:   Barbara Scott

                                 Measurements

Intervals                              Axis          

Rate:         56                       P:            61

AK:           166                      QRS:          36

QRSD:         98                       T:            40

QT:           497                                    

QTc:          479                                    

                           Interpretive Statements

Sinus bradycardia

Left ventricular hypertrophy

Early repolarization ST changes

No previous ECG available for comparison

Electronically Signed On 2022 13:11:57 EDT by Barbara Scott

## 2022-05-02 NOTE — PROGRESS NOTE
Assessment and Plan





- Patient Problems


(1) Acute pancreatitis


Current Visit: Yes   Status: Acute   


Qualifiers: 


   Pancreatitis type: alcohol induced 


Plan to address problem: 


Lipase is 62.  Improved from 1328.


Amylase is 101.  Improved from 492.


Clear liquids started and advance diet as tolerated


IV fluids for now


Pain management








(2) Rhabdomyolysis


Current Visit: Yes   Status: Acute   


Qualifiers: 


   Rhabdomyolysis type: non-traumatic   Qualified Code(s): M62.82 - 

Rhabdomyolysis   


Plan to address problem: 


Mild


Improved








(3) EtOH dependence


Current Visit: Yes   Status: Acute   


Qualifiers: 


   Substance use status: uncomplicated   Qualified Code(s): F10.20 - Alcohol 

dependence, uncomplicated   


Plan to address problem: 


Patient counseled about alcohol and avoidance of alcohol.  Also educated about 

alcohol effects on liver and pancreas








(4) Transaminitis


Current Visit: Yes   Status: Acute   


Plan to address problem: 


Mild


AST is 52


ALT is 32


Alcohol induced








(5) DVT prophylaxis


Current Visit: Yes   Status: Acute   


Plan to address problem: 


On anticoagulation GI prophylaxis








(6) Advance care planning


Current Visit: Yes   Status: Acute   


Plan to address problem: 


Disease education conducted, care plan discussed, diagnosis discussed, prognosis

discussed.  Patient is full code.  Patient acknowledged understanding and 

agreement with care plan.  +30 minutes.








(7) Discharge planning issues


Current Visit: Yes   Status: Acute   


Plan to address problem: 


Possible discharge tomorrow








Subjective


Date of service: 05/01/22


Principal diagnosis: Acute pancreatitis


Interval history: 





52-year-old with history of alcohol dependence and recurrent pancreatitis 

secondary to alcohol comes in for diffuse abdominal pain since yesterday.  Pain 

is about 10 on a scale of 1-10.  Pain is all over the abdomen.  Radiation to the

back present.  Last alcohol drink was yesterday.  No fever or chills. Alcohol is

exacerbating factor.  No relieving factors.





April 30, 2022


Patient is n.p.o.


Abdominal pain slightly better


Lipase is coming down to 706 from 1328


Amylase is 492





May 1, 2022


Lipase and amylase improved--- lipase is 62.  Improved from 1328.


Amylase is 101 improved from 492


Pain is better


Patient started on clears and advance diet as tolerated


Possible discharge tomorrow








Objective





- Constitutional


Vitals: 


                               Vital Signs - 12hr











  05/01/22 05/01/22





  20:23 21:30


 


Temperature 98.1 F 


 


Pulse Rate 63 


 


Respiratory 18 





Rate  


 


Blood Pressure 148/85 


 


O2 Sat by Pulse 96 99





Oximetry  











General appearance: Present: no acute distress, well-nourished





- EENT


Eyes: PERRL, EOM intact


ENT: hearing intact, clear oral mucosa


Ears: bilateral: normal





- Neck


Neck: supple, normal ROM





- Respiratory


Respiratory effort: normal


Respiratory: bilateral: CTA





- Breasts


Breasts: normal





- Cardiovascular


Heart rate: 78


Rhythm: regular


Heart Sounds: Present: S1 & S2.  Absent: gallop, rub


Extremities: pulses intact, No edema, normal color, Full ROM





- Gastrointestinal


General gastrointestinal: Present: soft, tender, non-distended, normal bowel 

sounds


Localized gastrointestinal: tender: diffuse (Improved)





- Genitourinary


Male genitourinary: normal





- Integumentary


Integumentary: clear, warm, dry





- Musculoskeletal


Musculoskeletal: 1, strength equal bilaterally





- Neurologic


Neurologic: moves all extremities





- Psychiatric


Psychiatric: memory intact, appropriate mood/affect, intact judgment & insight





- Labs


CBC & Chem 7: 


                                 05/02/22 04:28





                                 05/02/22 04:28


Labs: 


                              Abnormal lab results











  05/01/22 05/02/22 05/02/22 Range/Units





  14:00 04:28 04:28 


 


RBC    3.62 L  (3.65-5.03)  M/mm3


 


Hgb    10.8 L  (11.8-15.2)  gm/dl


 


Hct    33.5 L  (35.5-45.6)  %


 


Mono % (Auto)    10.9 H  (0.0-7.3)  %


 


Lymph # (Auto)    1.1 L  (1.2-5.4)  K/mm3


 


Potassium   3.5 L   (3.6-5.0)  mmol/L


 


Chloride   108.6 H   ()  mmol/L


 


BUN   4 L   (9-20)  mg/dL


 


Creatinine   0.7 L   (0.8-1.3)  mg/dL


 


Total Protein   5.0 L   (6.3-8.2)  g/dL


 


Albumin   3.6 L   (3.9-5)  g/dL


 


Lipase  62 H    (13-60)  units/L